# Patient Record
Sex: MALE
[De-identification: names, ages, dates, MRNs, and addresses within clinical notes are randomized per-mention and may not be internally consistent; named-entity substitution may affect disease eponyms.]

---

## 2021-01-26 PROBLEM — Z00.00 ENCOUNTER FOR PREVENTIVE HEALTH EXAMINATION: Status: ACTIVE | Noted: 2021-01-26

## 2021-01-27 ENCOUNTER — APPOINTMENT (OUTPATIENT)
Dept: UROLOGY | Facility: CLINIC | Age: 46
End: 2021-01-27
Payer: COMMERCIAL

## 2021-01-27 ENCOUNTER — TRANSCRIPTION ENCOUNTER (OUTPATIENT)
Age: 46
End: 2021-01-27

## 2021-01-27 VITALS
TEMPERATURE: 97.8 F | HEIGHT: 60 IN | OXYGEN SATURATION: 97 % | RESPIRATION RATE: 16 BRPM | HEART RATE: 88 BPM | DIASTOLIC BLOOD PRESSURE: 101 MMHG | BODY MASS INDEX: 39.85 KG/M2 | WEIGHT: 203 LBS | SYSTOLIC BLOOD PRESSURE: 145 MMHG

## 2021-01-27 DIAGNOSIS — Z80.42 FAMILY HISTORY OF MALIGNANT NEOPLASM OF PROSTATE: ICD-10-CM

## 2021-01-27 DIAGNOSIS — R30.9 PAINFUL MICTURITION, UNSPECIFIED: ICD-10-CM

## 2021-01-27 DIAGNOSIS — R33.9 RETENTION OF URINE, UNSPECIFIED: ICD-10-CM

## 2021-01-27 DIAGNOSIS — R35.1 NOCTURIA: ICD-10-CM

## 2021-01-27 DIAGNOSIS — K64.4 RESIDUAL HEMORRHOIDAL SKIN TAGS: ICD-10-CM

## 2021-01-27 DIAGNOSIS — Z80.3 FAMILY HISTORY OF MALIGNANT NEOPLASM OF BREAST: ICD-10-CM

## 2021-01-27 DIAGNOSIS — Z78.9 OTHER SPECIFIED HEALTH STATUS: ICD-10-CM

## 2021-01-27 PROCEDURE — 51741 ELECTRO-UROFLOWMETRY FIRST: CPT

## 2021-01-27 PROCEDURE — 99072 ADDL SUPL MATRL&STAF TM PHE: CPT

## 2021-01-27 PROCEDURE — 99203 OFFICE O/P NEW LOW 30 MIN: CPT | Mod: 25

## 2021-01-27 PROCEDURE — 51798 US URINE CAPACITY MEASURE: CPT | Mod: 59

## 2021-01-27 RX ORDER — ALFUZOSIN HYDROCHLORIDE 10 MG/1
10 TABLET, EXTENDED RELEASE ORAL
Qty: 30 | Refills: 3 | Status: ACTIVE | COMMUNITY
Start: 2021-01-27 | End: 1900-01-01

## 2021-01-27 NOTE — ASSESSMENT
[FreeTextEntry1] : Mr Og is a 45-year-old gentleman with symptoms of lower urinary tracts obstruction.  He has decreased urinary flow urgency frequency and nocturia.  These have been somewhat progressive.  He has a questional history of family history of prostate cancer.  He is not certain that his father had prostate cancer.  He has a postvoid residual of 154 cc.  A flow was then preformed PEAK FLOW 6.2\par Pt understands that some of the most common SE of this medication include dizziness, dry mouth, fatigue, lightheadedness, palpitations. Pt informed to stop the medication should any of these occur. 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 \par in light of family history of prostate cancer will proceed with PSA\par Patient wants to proceed,\par \par Referral fo general surgery re ? hemorrhoids if does not continue to improve\par Patient states symptoms improve\par \par \par

## 2021-01-27 NOTE — REVIEW OF SYSTEMS
[Eyesight Problems] : eyesight problems [Wake up at night to urinate  How many times?  ___] : wakes up to urinate [unfilled] times during the night [Strong urge to urinate] : strong urge to urinate [Slow urine stream] : slow urine stream [Negative] : Heme/Lymph

## 2021-01-27 NOTE — HISTORY OF PRESENT ILLNESS
[Urinary Urgency] : urinary urgency [Urinary Frequency] : urinary frequency [Nocturia] : nocturia [Straining] : straining [Weak Stream] : weak stream [FreeTextEntry1] : 45 year old male \par \par  x 6\par 2 children\par complaining of:\par 1. urinary symptoms: mild urgency with decreased stream; nocturia x 1; no dysuria ; no hematuria\par 2. family history of prostate cancer - 65 years old when developed  prostate cancer\par 3. hemorhoids [Urinary Incontinence] : no urinary incontinence [Urinary Retention] : no urinary retention [Intermittency] : no intermittency [Erectile Dysfunction] : no Erectile Dysfunction

## 2021-01-27 NOTE — PHYSICAL EXAM
[Edema] : no peripheral edema [Exaggerated Use Of Accessory Muscles For Inspiration] : no accessory muscle use [Abdomen Tenderness] : non-tender [] : no hepato-splenomegaly [Urethral Meatus] : meatus normal [Penis Abnormality] : normal uncircumcised penis [Epididymis] : the epididymides were normal [Testes Tenderness] : no tenderness of the testes [Testes Mass (___cm)] : there were no testicular masses [Prostate Enlargement] : the prostate was not enlarged [Prostate Tenderness] : the prostate was not tender [No Prostate Nodules] : no prostate nodules [Normal Station and Gait] : the gait and station were normal for the patient's age [Skin Color & Pigmentation] : normal skin color and pigmentation [No Focal Deficits] : no focal deficits [FreeTextEntry1] :

## 2021-01-28 LAB
ANION GAP SERPL CALC-SCNC: 12 MMOL/L
APPEARANCE: CLEAR
BACTERIA: NEGATIVE
BILIRUBIN URINE: NEGATIVE
BLOOD URINE: NEGATIVE
BUN SERPL-MCNC: 10 MG/DL
CALCIUM SERPL-MCNC: 9.5 MG/DL
CHLORIDE SERPL-SCNC: 102 MMOL/L
CO2 SERPL-SCNC: 26 MMOL/L
COLOR: COLORLESS
CREAT SERPL-MCNC: 0.99 MG/DL
GLUCOSE QUALITATIVE U: NEGATIVE
GLUCOSE SERPL-MCNC: 121 MG/DL
HYALINE CASTS: 0 /LPF
KETONES URINE: NEGATIVE
LEUKOCYTE ESTERASE URINE: NEGATIVE
MICROSCOPIC-UA: NORMAL
NITRITE URINE: NEGATIVE
PH URINE: 7.5
POTASSIUM SERPL-SCNC: 4.9 MMOL/L
PROTEIN URINE: NEGATIVE
PSA SERPL-MCNC: 0.65 NG/ML
RED BLOOD CELLS URINE: 0 /HPF
SODIUM SERPL-SCNC: 141 MMOL/L
SPECIFIC GRAVITY URINE: 1
SQUAMOUS EPITHELIAL CELLS: 0 /HPF
UROBILINOGEN URINE: NORMAL
WHITE BLOOD CELLS URINE: 0 /HPF

## 2021-03-02 ENCOUNTER — APPOINTMENT (OUTPATIENT)
Dept: UROLOGY | Facility: AMBULATORY SURGERY CENTER | Age: 46
End: 2021-03-02

## 2021-03-09 ENCOUNTER — NON-APPOINTMENT (OUTPATIENT)
Age: 46
End: 2021-03-09

## 2021-03-09 ENCOUNTER — APPOINTMENT (OUTPATIENT)
Dept: UROLOGY | Facility: AMBULATORY SURGERY CENTER | Age: 46
End: 2021-03-09